# Patient Record
Sex: MALE | Race: WHITE | Employment: FULL TIME | URBAN - METROPOLITAN AREA
[De-identification: names, ages, dates, MRNs, and addresses within clinical notes are randomized per-mention and may not be internally consistent; named-entity substitution may affect disease eponyms.]

---

## 2019-03-15 PROCEDURE — 87389 HIV-1 AG W/HIV-1&-2 AB AG IA: CPT | Performed by: FAMILY MEDICINE

## 2019-03-15 PROCEDURE — 86803 HEPATITIS C AB TEST: CPT | Performed by: FAMILY MEDICINE

## 2021-07-16 ENCOUNTER — HOSPITAL ENCOUNTER (OUTPATIENT)
Age: 26
Discharge: HOME | End: 2021-07-16
Payer: COMMERCIAL

## 2021-07-16 VITALS
HEART RATE: 76 BPM | TEMPERATURE: 99 F | HEIGHT: 72 IN | BODY MASS INDEX: 28.44 KG/M2 | SYSTOLIC BLOOD PRESSURE: 139 MMHG | DIASTOLIC BLOOD PRESSURE: 62 MMHG | RESPIRATION RATE: 20 BRPM | OXYGEN SATURATION: 98 % | WEIGHT: 210 LBS

## 2021-07-16 DIAGNOSIS — Z20.822 ENCOUNTER FOR LABORATORY TESTING FOR COVID-19 VIRUS: Primary | ICD-10-CM

## 2021-07-16 LAB — SARS-COV-2 RNA RESP QL NAA+PROBE: NOT DETECTED

## 2021-07-16 PROCEDURE — 99202 OFFICE O/P NEW SF 15 MIN: CPT | Performed by: NURSE PRACTITIONER

## 2021-07-16 PROCEDURE — U0002 COVID-19 LAB TEST NON-CDC: HCPCS | Performed by: NURSE PRACTITIONER

## 2021-07-16 NOTE — ED INITIAL ASSESSMENT (HPI)
Pt presents to the [address] [place] [address] with c/o wanting a covid test s/p being exposed to covid on Saturday. No symptoms.

## 2021-07-17 NOTE — ED PROVIDER NOTES
Patient Seen in: [facility]      History   Patient presents with:  Covid-19 Test    Stated Complaint: COVID TEST EXPOSURE NO SYMPTOMS    HPI/Subjective:   Yolistephen Gudino is a 69-year-old male presenting to the immediate care for Covid testing. Device None (Room air)       Current:/62   Pulse 76   Temp 98.7 °F (37.1 °C) (Temporal)   Resp 20   Ht 182.9 cm (6')   Wt 95.3 kg   SpO2 98%   BMI 28.48 kg/m²         Physical Exam  Vitals and nursing note reviewed.    Constitutional:       General: H Rapid SARS-CoV-2 by PCR Not Detected Not Detected       MDM   30year-old male presenting for COVID testing. HPI and physical exam as noted above. Rapid COVID is negative. Patient stable for discharge. Follow-up with primary care.  Return or go to ER for